# Patient Record
Sex: MALE | Race: WHITE | NOT HISPANIC OR LATINO | Employment: FULL TIME | URBAN - METROPOLITAN AREA
[De-identification: names, ages, dates, MRNs, and addresses within clinical notes are randomized per-mention and may not be internally consistent; named-entity substitution may affect disease eponyms.]

---

## 2023-09-02 ENCOUNTER — HOSPITAL ENCOUNTER (EMERGENCY)
Facility: CLINIC | Age: 33
Discharge: HOME | End: 2023-09-02
Attending: EMERGENCY MEDICINE | Admitting: EMERGENCY MEDICINE
Payer: COMMERCIAL

## 2023-09-02 ENCOUNTER — APPOINTMENT (OUTPATIENT)
Dept: GENERAL RADIOLOGY | Facility: CLINIC | Age: 33
End: 2023-09-02
Attending: STUDENT IN AN ORGANIZED HEALTH CARE EDUCATION/TRAINING PROGRAM
Payer: COMMERCIAL

## 2023-09-02 VITALS
RESPIRATION RATE: 18 BRPM | TEMPERATURE: 97.8 F | SYSTOLIC BLOOD PRESSURE: 139 MMHG | OXYGEN SATURATION: 99 % | DIASTOLIC BLOOD PRESSURE: 89 MMHG | HEART RATE: 78 BPM

## 2023-09-02 DIAGNOSIS — M25.511 RIGHT SHOULDER PAIN: ICD-10-CM

## 2023-09-02 PROCEDURE — 73030 X-RAY EXAM OF SHOULDER: CPT | Mod: RT

## 2023-09-02 PROCEDURE — 250N000013 HC RX MED GY IP 250 OP 250 PS 637: Performed by: STUDENT IN AN ORGANIZED HEALTH CARE EDUCATION/TRAINING PROGRAM

## 2023-09-02 PROCEDURE — 99283 EMERGENCY DEPT VISIT LOW MDM: CPT

## 2023-09-02 RX ORDER — IBUPROFEN 600 MG/1
600 TABLET, FILM COATED ORAL ONCE
Status: COMPLETED | OUTPATIENT
Start: 2023-09-02 | End: 2023-09-02

## 2023-09-02 RX ORDER — CYCLOBENZAPRINE HCL 10 MG
10 TABLET ORAL 3 TIMES DAILY PRN
Qty: 15 TABLET | Refills: 0 | Status: SHIPPED | OUTPATIENT
Start: 2023-09-02

## 2023-09-02 RX ADMIN — IBUPROFEN 600 MG: 600 TABLET, FILM COATED ORAL at 13:34

## 2023-09-02 NOTE — ED PROVIDER NOTES
ED ATTENDING PHYSICIAN NOTE:   I evaluated this patient in conjunction with Maricel Patel PA-C  I have participated in the care of the patient and personally performed key elements of the history, exam, and medical decision making.      HPI:     Guzman Santana is a 33 year old male presents with right shoulder/neck pain.  2 months ago patient was lifting and was doing reverse butterflies. He had acute onset of right neck/shoulder pain during this exercise.  Ultimately symptoms improved and returned to baseline.  2 days ago he had gradually worsening of identical pain in the same location but without overt injury.  Pain is most significant just medial and cephalad to the right scapula and is aggravated by certain movements.  He denies any wyatt loss of sensation or strength in the upper extremities.  He denies neck pain.  He has no other complaints or concerns.    Independent Historian:   None    Review of External Notes: None      EXAM:     General:   Pleasant, age appropriate  HEENT:    Oropharynx is moist  EYES:    Conjunctiva normal.  NECK:    Supple, no meningismus.      No focal tenderness, bony step-off     Spurling's test negative  CV:     Regular rate and rhythm     No murmurs, rubs or gallops.       2+ radial pulses bilateral.  PULM:    Clear to auscultation bilateral.       No respiratory distress.    MSK:     Right upper extremity:      No deformity to the AC or glenohumeral joint      No tenderness to the A/C joint, proximal humerus      Neer's and Ly test negative      Minimal pain with active/passive range of motion of the shoulder      Focal area of tenderness to the cephalad portion of the trapezius muscle medial to the upper border of the scapula      No tenderness or deformity to the elbow and wrist; full passive ROM.  LYMPH:   No cervical lymphadenopathy.  NEURO:   Right upper extremity:      Axillary, median, radial and ulnar nerve intact to motor and sensation.  SKIN:    Warm, dry and  intact.       No rash.  PSYCH:    Mood is good      Independent Interpretation (X-rays, CTs, rhythm strip):  I independently reviewed right shoulder x-ray which shows no fracture or dislocation    Consultations/Discussion of Management or Tests:  None     Social Determinants of Health affecting care:   None     MEDICAL DECISION MAKING/ASSESSMENT AND PLAN:     33-year-old male presents with atraumatic right shoulder pain.  Pain isolated to the trapezius muscles.  X-rays of the shoulder are unremarkable for dislocation or fracture.  Evaluation not consistent with impingement syndrome, rotator cuff Tendinopathy, septic or inflammatory arthritis.  Evaluation is most consistent with muscle strain and/or spasm.  Limited supply of Flexeril, outpatient physical therapy and follow-up if not improved.     DIAGNOSIS:     ICD-10-CM    1. Right shoulder pain  M25.511 Physical Therapy Referral      2. Rotator cuff injury  S46.009A                DISPOSITION:   Discharged to home     Scribe Disclosure:  MAGGI, Trice Wills, am serving as a scribe at 1:11 PM on 9/2/2023 to document services personally performed by Boy Renee MD based on my observations and the provider's statements to me.     9/2/2023  [facility] EMERGENCY DEPT     Boy Renee MD  09/02/23 1525

## 2023-09-02 NOTE — ED TRIAGE NOTES
Patient reports ongoing neck pain.  He reports an injury about 2 months ago.  He reports increased right sided neck pain the the last 2-3 days with some numbness down his right arm.  History of HTN.  ABCs intact, A&Ox4.     Triage Assessment       Row Name 09/02/23 1053       Triage Assessment (Adult)    Airway WDL WDL       Respiratory WDL    Respiratory WDL WDL       Skin Circulation/Temperature WDL    Skin Circulation/Temperature WDL WDL       Cardiac WDL    Cardiac WDL WDL       Peripheral/Neurovascular WDL    Peripheral Neurovascular WDL WDL       Cognitive/Neuro/Behavioral WDL    Cognitive/Neuro/Behavioral WDL WDL

## 2023-09-02 NOTE — DISCHARGE INSTRUCTIONS
Please make an appointment to follow up with [facility] [phone] as soon as possible for follow up of your shoulder injury.    Continue use of 600 mg of ibuprofen every 6 hours for ongoing pain.  Heat or ice to the area may provide some symptomatic relief as well.    I suspect you have a rotator cuff injury.  For this, I have ordered a physical therapy referral.  They should call in the next few days to help coordinate scheduling.

## 2023-09-02 NOTE — ED PROVIDER NOTES
History     Chief Complaint:  Neck Pain       HPI   Guzman Santana is a 33 year old male with right shoulder pain.  Patient states that roughly 2 months ago he was in the gym lifting, doing reverse fly when he believes he pulled his right neck/shoulder.  Pain is short-lived and patient has been symptom free for quite some time however pain worsened randomly 2 days ago.  Patient states that he has not been lifting or exercising his upper body since the initial injury.  Pain was initially mild to moderate in severity however is significantly increased over the past 2 nights causing patient to be unable to sleep.  Has been taking Tylenol and aspirin with minimal relief.  Endorses pain with rotation of neck and shoulder movements.    Independent Historian:   None - Patient Only    Review of External Notes:   None       Medications:    No current outpatient medications on file.      Past Medical History:    No past medical history on file.    Past Surgical History:    No past surgical history on file.     Physical Exam   Patient Vitals for the past 24 hrs:   BP Temp Temp src Pulse Resp SpO2   09/02/23 1052 139/89 97.8  F (36.6  C) Temporal 78 18 99 %        Physical Exam  General: Alert and cooperative with exam. Patient in no apparent distress. Normal mentation.  Head:  Scalp is NC/AT  Eyes:  No scleral icterus, PERRL  ENT:  The external nose and ears are normal.   Neck:  Normal range of motion without rigidity.  CV:  Regular rate and rhythm    No pathologic murmur   Resp:  Breath sounds are clear bilaterally    Non-labored, no retractions or accessory muscle use  GI:  Abdomen is soft, no distension, no tenderness. No peritoneal signs  MS:  Pain with abduction and flexion of right shoulder.  Right shoulder appears high riding when compared to the left.  Pain to palpation of right-sided supraspinatus muscle  Skin:  Warm and dry, No rash or lesions noted.  Neuro:  Oriented x 3. No gross motor deficits.      Emergency  Department Course     Imaging:  XR Shoulder Right G/E 3 Views   Preliminary Result   IMPRESSION: Normal joint spaces and alignment. No definite fracture.         XR Scapula Right    (Results Pending)      Report per radiology    Laboratory:  Labs Ordered and Resulted from Time of ED Arrival to Time of ED Departure - No data to display     Procedures   None    Emergency Department Course & Assessments:    Interventions:  Medications   ibuprofen (ADVIL/MOTRIN) tablet 600 mg (600 mg Oral $Given 9/2/23 4617)       Independent Interpretation (X-rays, CTs, rhythm strip):  Right shoulder x-ray without fractures or dislocations.  Scapula appears intact    Consultations/Discussion of Management or Tests:  None        Social Determinants of Health affecting care:   None    Disposition:  The patient was discharged to home.     Impression & Plan      Medical Decision Making:  Guzman Santana is a 33 year old male who presents with right shoulder pain.  On exam, pain to palpation of supraspinatus muscle on right.  Full range of motion at the elbow and hand.  Pain with flexion and abduction of right shoulder.  Sensation intact to bilateral upper extremities equally.  Suspect rotator cuff injury as cause of patient's symptoms.  Shoulder x-ray without any fractures noted to scapula or within upper extremity.  Provided ibuprofen in the ER and recommend patient continue 600 mg every 6 hours for ongoing symptoms.  Ice and heat to the area also recommended.  Physical therapy referral provided and instructed patient to follow-up with [facility] for follow-up and reassessment.  Discussed reasons to return to ER.      Diagnosis:    ICD-10-CM    1. Right shoulder pain  M25.511 Physical Therapy Referral      2. Rotator cuff injury  S46.009A            Discharge Medications:  New Prescriptions    No medications on file        9/2/2023   ANGEL Taylor Lauren R, PA-C  09/02/23 140

## 2023-09-03 ENCOUNTER — NURSE TRIAGE (OUTPATIENT)
Dept: NURSING | Facility: CLINIC | Age: 33
End: 2023-09-03
Payer: COMMERCIAL

## 2023-09-03 NOTE — TELEPHONE ENCOUNTER
"Wife is the caller with pt.  Pt seen in ED yesterday, 9/2/23 for shoulder pain.  Wife stating that Ibuprofen, Ice, Flexeril are not working and pt continues with severe pain.  Pt not able to sleep during the night.  Pt will follow Care Advice and follow up with clinic if anything needed further.  Fidelina Salinas RN  [facility] Nurse Advisor    Reason for Disposition   Followed a shoulder injury   [1] SEVERE pain AND [2] not improved 2 hours after pain medicine/ice packs    Additional Information   Negative: Serious injury with multiple fractures (broken bones)   Negative: [1] Major bleeding (e.g., actively dripping or spurting) AND [2] can't be stopped   Negative: Bullet wound, stabbed by knife, or other serious penetrating wound   Negative: Sounds like a life-threatening emergency to the triager   Negative: Wound looks infected   Negative: Shoulder pain from overuse (work, exercise, gardening) OR from self-induced lifting injury   Negative: Shoulder pain not from an injury   Negative: Looks like a broken bone (crooked or deformed)   Negative: Looks like a dislocated joint   Negative: Can't move injured shoulder at all   Negative: Skin is split open or gaping (or length > 1/2 inch or 12 mm)   Negative: [1] Bleeding AND [2] won't stop after 10 minutes of direct pressure (using correct technique)   Negative: [1] Dirt in the wound AND [2] not removed with 15 minutes of scrubbing   Negative: Sounds like a serious injury to the triager   Negative: Passed out (i.e., lost consciousness, collapsed and was not responding)   Negative: Shock suspected (e.g., cold/pale/clammy skin, too weak to stand, low BP, rapid pulse)   Negative: [1] Similar pain previously AND [2] it was from \"heart attack\"   Negative: [1] Similar pain previously AND [2] it was from \"angina\" AND [3] not relieved by nitroglycerin   Negative: Sounds like a life-threatening emergency to the triager    Protocols used: Shoulder Pain-A-AH, Shoulder Injury-A-AH    "